# Patient Record
Sex: MALE | Race: ASIAN | NOT HISPANIC OR LATINO | URBAN - METROPOLITAN AREA
[De-identification: names, ages, dates, MRNs, and addresses within clinical notes are randomized per-mention and may not be internally consistent; named-entity substitution may affect disease eponyms.]

---

## 2017-06-09 ENCOUNTER — EMERGENCY (EMERGENCY)
Facility: HOSPITAL | Age: 32
LOS: 1 days | Discharge: HOME | End: 2017-06-09
Attending: EMERGENCY MEDICINE
Payer: MEDICAID

## 2017-06-09 VITALS
HEIGHT: 71 IN | DIASTOLIC BLOOD PRESSURE: 87 MMHG | WEIGHT: 233.03 LBS | TEMPERATURE: 99 F | SYSTOLIC BLOOD PRESSURE: 142 MMHG | RESPIRATION RATE: 20 BRPM | OXYGEN SATURATION: 100 % | HEART RATE: 91 BPM

## 2017-06-09 DIAGNOSIS — M17.12 UNILATERAL PRIMARY OSTEOARTHRITIS, LEFT KNEE: ICD-10-CM

## 2017-06-09 DIAGNOSIS — E89.0 POSTPROCEDURAL HYPOTHYROIDISM: Chronic | ICD-10-CM

## 2017-06-09 PROCEDURE — 99282 EMERGENCY DEPT VISIT SF MDM: CPT | Mod: 25

## 2017-06-09 PROCEDURE — 99283 EMERGENCY DEPT VISIT LOW MDM: CPT

## 2017-06-09 NOTE — ED PROVIDER NOTE - MEDICAL DECISION MAKING DETAILS
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

## 2017-06-09 NOTE — ED ADULT NURSE NOTE - CHIEF COMPLAINT QUOTE
pt c/o swelling and pain to left knee noticed last thurs, pain went away and came yesterday morning denies any injury

## 2017-06-09 NOTE — ED PROVIDER NOTE - OBJECTIVE STATEMENT
30 y/o M with PMHx of thyroid cancer, s/p surgery, presents to ED c/o sudden onset swelling and pain in L knee. Pt states that symptoms onset 1 week ago, and spontaneously subsided 3 days later, and then returned this morning. Pt describes pain to be like a tightness, he is unable to walk straight. Pt notes similar episode in R leg last year, was told he does not have gout by PMD. Denies any deformities, fever, stiffness, throat pain or any other complaints. NKDA. 32 y/o M with PMHx of thyroid cancer, s/p surgery, presents to ED c/o sudden onset swelling and pain in L knee. Pt states that symptoms onset 1 week ago, and spontaneously subsided 3 days later, and then returned this morning. Pt describes pain to be like a tightness, he is unable to walk straight. Pt notes similar episode in R leg last year, had xrays and w/u in ED, was later told he does not have gout by PMD. Denies any deformities, fever, stiffness, throat pain or any other complaints. NKDA.

## 2020-11-13 PROBLEM — Z00.00 ENCOUNTER FOR PREVENTIVE HEALTH EXAMINATION: Status: ACTIVE | Noted: 2020-11-13

## 2020-11-20 ENCOUNTER — APPOINTMENT (OUTPATIENT)
Dept: ORTHOPEDIC SURGERY | Facility: CLINIC | Age: 35
End: 2020-11-20
Payer: MEDICAID

## 2020-11-20 VITALS
WEIGHT: 238 LBS | HEART RATE: 87 BPM | HEIGHT: 71 IN | BODY MASS INDEX: 33.32 KG/M2 | SYSTOLIC BLOOD PRESSURE: 157 MMHG | DIASTOLIC BLOOD PRESSURE: 96 MMHG

## 2020-11-20 DIAGNOSIS — M25.521 PAIN IN RIGHT ELBOW: ICD-10-CM

## 2020-11-20 DIAGNOSIS — Z85.9 PERSONAL HISTORY OF MALIGNANT NEOPLASM, UNSPECIFIED: ICD-10-CM

## 2020-11-20 PROCEDURE — 73080 X-RAY EXAM OF ELBOW: CPT | Mod: RT

## 2020-11-20 PROCEDURE — 99204 OFFICE O/P NEW MOD 45 MIN: CPT

## 2020-11-20 RX ORDER — LEVOTHYROXINE SODIUM 88 UG/1
88 CAPSULE ORAL
Refills: 0 | Status: ACTIVE | COMMUNITY

## 2020-11-20 RX ORDER — DICLOFENAC SODIUM 50 MG/1
50 TABLET, DELAYED RELEASE ORAL TWICE DAILY
Qty: 60 | Refills: 1 | Status: ACTIVE | COMMUNITY
Start: 2020-11-20 | End: 1900-01-01

## 2020-11-20 NOTE — PHYSICAL EXAM
[de-identified] : Physical Examination\par General: well nourished, in no acute distress, alert and oriented to person, place and time\par Psychiatric: normal mood and affect, no abnormal movements or speech patterns\par Eyes: vision intact - glasses\par Throat: no thyromegaly\par Lymph: no enlarged nodes, no lymphedema in extremity\par Respiratory: no wheezing, no shortness of breath with ambulation\par Cardiac: no cardiac leg swelling, 2+ peripheral pulses\par Neurology: normal gross sensation in extremities to light touch\par Abdomen: soft, non-tender, tympanic, no masses\par \par Musculoskeletal Examination\par Cervical spine		Full painless range of motion and negative Spurling's test\par \par Elbow     			Right			Left\par Appearance\par      Skin 				normal			normal\par      Swelling/Deformity		normal			normal\par  Range of Motion\par      Flexion			160			160\par      Extension			0			0\par      Supination			85			85\par      Pronation			90			90\par Palpation\par      Radial Head			-			-\par      Lateral Epicondyle		-			-\par      Medial Epicondyle		-			-\par      Olecranon			-			-\par      Triceps Tendon		-			-\par      Biceps Tendon		-			-\par \par Strength Examination\par      Flexion 			5+ / 0			5+ / 0\par      Extension			5+ / 0			5+ / 0\par      Supination			5+ / 0			5+ / 0\par      Pronation			5+ / 0			5+ / 0\par      				normal			normal\par \par Special Examination\par      Milking Posterolateral		-			-\par      Chair Rise Posteromedial 	-			-\par      Ulnar Cubital Tunnel Tinnels	-			-\par      Sublux Ulnar Nerve		-			-\par      \par Sensation\par      Axillary			normal			normal\par      LatAntCubBrach 		normal			normal\par      Median 			normal			normal\par      Ulnar 			normal			normal\par      Radial 			normal			normal\par Motor\par      AIN 				normal			normal\par      Ulnar 			normal			normal\par      Radial 			normal			normal\par      PIN 				normal			normal\par Pulses\par      Radial			2+			2+\par  [de-identified] : 3 views of the R elbow (AP, lateral and radial head)\par were ordered, obtained and evaluated by myself today and\par demonstrate:\par The radial head aligns with the capitellum on all views\par mild medial ulnar/humeral degenerative joint disease\par no fracture\par no dislocation \par Otherwise normal osseous bone structure and soft tissue contour\par

## 2020-11-20 NOTE — DISCUSSION/SUMMARY
[de-identified] : Right elbow pain\par Mild degenerative changes seen on elbow x-ray\par Ultrasound examination demonstrates no effusion\par \par \par \par I discussed my findings and history exam and radiology\par \par Given the lack of symptoms on clinical exam today and the mild symptoms reported by the patient currently have a difficult time diagnosing the patient's cause his pain\par \par given the multitude of swollen joint and pain, recommend rheum follow up for inflammatory joint disease and not gout.\par \par diclofenac\par \par pt\par \par fu after pt

## 2020-11-20 NOTE — HISTORY OF PRESENT ILLNESS
[de-identified] : CC RIGHT Elbow\par \par HPI 35 year yo M right HD presents with gradual onset of 2mo of intermittent pain in the posterior elbow without an injury. The pain is better and rated a 1 out of 10, described as sharp and throbbing without radiation. Heat makes the pain better and carrying heavy bags makes the pain worse. The patient denies associated symptoms. The patient denies pain at night affecting sleep, denies neck pain, and denies similar pain previously.\par \par The patient has tried the following treatments:\par Activity modification	+\par Ice			-\par Braces    		-\par Nsaids    		+ ibuprofin with relief\par Physical Therapy  	-\par Cortisone Injection	-\par Arthroscopy/Surgery	-\par \par Review of Systems is positive for the above musculoskeletal symptoms and is otherwise non-contributory for general, constitutional, psychiatric, neurologic, HEENT, cardiac, respiratory, gastrointestinal, reproductive, lymphatic, and dermatologic complaints.\par \par Consult by Dr Redd Noyola

## 2021-03-12 ENCOUNTER — TRANSCRIPTION ENCOUNTER (OUTPATIENT)
Age: 36
End: 2021-03-12

## 2021-08-13 ENCOUNTER — RX RENEWAL (OUTPATIENT)
Age: 36
End: 2021-08-13

## 2021-08-23 ENCOUNTER — TRANSCRIPTION ENCOUNTER (OUTPATIENT)
Age: 36
End: 2021-08-23

## 2022-09-21 ENCOUNTER — APPOINTMENT (OUTPATIENT)
Dept: UROLOGY | Facility: CLINIC | Age: 37
End: 2022-09-21

## 2023-07-14 NOTE — ED PROVIDER NOTE - AGGRAVATING FACTORS
Chief Complaint   Patient presents with     RECHECK    Medications reviewed and vital signs taken.   Patrick Hernandez CMA     
none

## 2024-03-04 NOTE — ED PROVIDER NOTE - MOUTH/THROAT [-], MLM
[de-identified] : Follow-up visit for 53-year-old white male last seen by me on January 2, 2024, for reevaluation of a lesion on the right posterior shoulder last described as a 5 x 3 mm brown/red slightly scaly papule, lighter and more raised in center.  Diagnosed as a?  Dermatofibroma and observed.  No history of skin cancer.  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  Contact dermatitis.  Treated with: Continue Cetaphil gentle skin cleanser Continue 2-1/2% hydrocortisone ointment to eyelids once or twice a day as needed Continue mometasone cream 0.1% to forehead once or twice a day as needed Patch testing has been discussed.  Rash is recently spread to the scalp and ears.  Treated with head and shoulder shampoo with improvement.   [FreeTextEntry1] : Rash on face and growth on shoulder no throat pain